# Patient Record
Sex: FEMALE | URBAN - METROPOLITAN AREA
[De-identification: names, ages, dates, MRNs, and addresses within clinical notes are randomized per-mention and may not be internally consistent; named-entity substitution may affect disease eponyms.]

---

## 2018-04-30 ENCOUNTER — IMPORTED ENCOUNTER (OUTPATIENT)
Dept: URBAN - METROPOLITAN AREA CLINIC 50 | Facility: CLINIC | Age: 43
End: 2018-04-30

## 2018-07-20 NOTE — PATIENT DISCUSSION
Continue: GenTeal Severe (artificial tears(hypromellose)): gel: 0.3% 1 a small amount four times a day as needed into affected eye 07-

## 2018-07-20 NOTE — PATIENT DISCUSSION
ECTROPION LLL: Edcuated patient/daughter about diagnosis. Recommend evaluation with Dr. Luke Dior as soon as possible as paitent is in discomfort with dryness. Expained to patient/daughter that the goal is to keep the cornea lubricated until seen by Dr. Luke Dior. ECN marco antonio QID OS (due to blepharitis along wth ectropion) and GENTEAL GEL QID OS. Warm compresses TID. Use artificial tears frequently. Will continue to monitor.

## 2018-07-20 NOTE — PATIENT DISCUSSION
Continue: erythromycin (erythromycin): ointment: 5 mg/gram (0.5 %) 1 a thin layer four times a day as directed into affected eye 07-

## 2018-07-20 NOTE — PATIENT DISCUSSION
BLEPHARITIS:  I have explained the nature of chronic blepharitis and have instructed the patient in lid hygiene techniques. The patient is instructed to gently wash the lid margins of the right eye daily with warm water and baby shampoo on a washrag followed by warm water irrigation. Hold off on scrubbing the left eye until after seen by Dr. Usman Khan. ECN JOSE ARMANDO BID OD and ECN QID OS (due to concurrent ectropion). Patient was given RX for [employer].

## 2018-07-24 NOTE — PATIENT DISCUSSION
ECTROPION OU: I have informed the patient that ectropion is a condition in which the lid is lax and turns away from the eye, causing irritation by not holding the tears against the eye. The lid and eye may become red and irritated. I have discussed options with patient, surgery versus following. The risks, benefits, alternatives include anesthesia, bleeding, infection, inflammation, swelling, bruising. Patient understands and desires to improve lid laxity and irritation. Gave Rx for Erythromycin ointment to be used as directed.

## 2018-09-04 NOTE — PATIENT DISCUSSION
Continue: bacitracin (bacitracin): ointment: 500 unit/gram 1 a small amount twice a day as directed into both eyes 08-

## 2018-10-01 ENCOUNTER — IMPORTED ENCOUNTER (OUTPATIENT)
Dept: URBAN - METROPOLITAN AREA CLINIC 50 | Facility: CLINIC | Age: 43
End: 2018-10-01

## 2019-01-31 NOTE — PATIENT DISCUSSION
Blepharitis Plan - gave rx of Maxitrol ointment to aplly in both eyes at bed time , advised pt if no changed to call for an appt sooner than scheduled

## 2019-01-31 NOTE — PATIENT DISCUSSION
BLEPHARITIS:  I have explained the nature of chronic blepharitis and have instructed the patient in lid hygiene techniques. The patient is instructed to gently wash the lid margins daily with warm water and baby shampoo on a washrag followed by warm water irrigation.  Patient was given RX for

## 2019-01-31 NOTE — PATIENT DISCUSSION
DRY EYES : Discussed with patient the importance of keeping the eye moist and the symptoms associated with dry eyes including blurry vision, tearing, burning, and rich sensation. Advised patient to minimize use of any fans blowing directly on the face. Advised patient to continue with artificial tears 2-3 times daily.

## 2019-01-31 NOTE — PATIENT DISCUSSION
New Prescription: Maxitrol (neomycin-polymyxin-dexameth): ointment: 3.5-10,000-0.1 mg-unit/g-% a small amount at bedtime as directed into both eyes 01-

## 2019-02-28 NOTE — PATIENT DISCUSSION
Continue: neomycin-polymyxin-dexameth (neomycin-polymyxin-dexameth): ointment: 3.5-10,000-0.1 mg-unit/g-%

## 2019-02-28 NOTE — PATIENT DISCUSSION
Continue: Maxitrol (neomycin-polymyxin-dexameth): ointment: 3.5-10,000-0.1 mg-unit/g-% a small amount at bedtime as directed into both eyes 01-

## 2019-03-07 ENCOUNTER — IMPORTED ENCOUNTER (OUTPATIENT)
Dept: URBAN - METROPOLITAN AREA CLINIC 50 | Facility: CLINIC | Age: 44
End: 2019-03-07

## 2019-03-07 NOTE — PATIENT DISCUSSION
"""Reviewed proper use and contact lens care. Discussed possible risks associated with contact lens wear.  Educated patient no sleeping

## 2019-03-11 ENCOUNTER — IMPORTED ENCOUNTER (OUTPATIENT)
Dept: URBAN - METROPOLITAN AREA CLINIC 50 | Facility: CLINIC | Age: 44
End: 2019-03-11

## 2019-03-25 ENCOUNTER — IMPORTED ENCOUNTER (OUTPATIENT)
Dept: URBAN - METROPOLITAN AREA CLINIC 50 | Facility: CLINIC | Age: 44
End: 2019-03-25

## 2019-04-05 ENCOUNTER — IMPORTED ENCOUNTER (OUTPATIENT)
Dept: URBAN - METROPOLITAN AREA CLINIC 50 | Facility: CLINIC | Age: 44
End: 2019-04-05

## 2019-04-15 ENCOUNTER — IMPORTED ENCOUNTER (OUTPATIENT)
Dept: URBAN - METROPOLITAN AREA CLINIC 50 | Facility: CLINIC | Age: 44
End: 2019-04-15

## 2019-06-25 NOTE — PATIENT DISCUSSION
New Prescription: erythromycin (erythromycin): ointment: 5 mg/gram (0.5 %) a small amount twice a day as directed into both eyes 06-

## 2020-03-31 ENCOUNTER — IMPORTED ENCOUNTER (OUTPATIENT)
Dept: URBAN - METROPOLITAN AREA CLINIC 50 | Facility: CLINIC | Age: 45
End: 2020-03-31

## 2021-04-18 ASSESSMENT — VISUAL ACUITY
OS_SC: 20/20
OD_SC: 20/20
OS_CC: J1+(-)
OS_CC: J1+
OS_CC: J1+@ 16 IN
OS_SC: 20/20-1
OD_CC: J1+
OD_CC: J1+@ 16 IN
OD_CC: J1+(-)
OD_SC: 20/20

## 2021-04-18 ASSESSMENT — TONOMETRY
OS_IOP_MMHG: 8
OD_IOP_MMHG: 9